# Patient Record
Sex: MALE | Race: AMERICAN INDIAN OR ALASKA NATIVE
[De-identification: names, ages, dates, MRNs, and addresses within clinical notes are randomized per-mention and may not be internally consistent; named-entity substitution may affect disease eponyms.]

---

## 2019-01-10 ENCOUNTER — HOSPITAL ENCOUNTER (OUTPATIENT)
Dept: HOSPITAL 5 - VAS | Age: 74
Discharge: HOME | End: 2019-01-10
Attending: INTERNAL MEDICINE
Payer: MEDICARE

## 2019-01-10 DIAGNOSIS — I82.401: Primary | ICD-10-CM

## 2019-01-10 NOTE — VASCULAR LAB REPORT
FINAL REPORT



PROCEDURE:  Right lower extremity duplex venous ultrasound. 



TECHNIQUE:  Routine imaging of the deep venous system was performed. This included Doppler spectral a
nalysis and color-flow imaging.



HISTORY:  RIGHT LEG SWELLING 



COMPARISON:  No prior studies are available for comparison.



FINDINGS:  

There is echogenic material in the popliteal vein and posterior tibial vein. These veins did not comp
ress. The common femoral vein and superficial femoral vein are patent. Only 1 of the peroneal veins w
as seen with blood flow and compression.



IMPRESSION:  

Deep venous thrombosis as described.